# Patient Record
Sex: FEMALE | Race: BLACK OR AFRICAN AMERICAN | Employment: FULL TIME | URBAN - METROPOLITAN AREA
[De-identification: names, ages, dates, MRNs, and addresses within clinical notes are randomized per-mention and may not be internally consistent; named-entity substitution may affect disease eponyms.]

---

## 2019-11-14 ENCOUNTER — HOSPITAL ENCOUNTER (OUTPATIENT)
Dept: GENERAL RADIOLOGY | Age: 34
Discharge: HOME | End: 2019-11-14
Payer: COMMERCIAL

## 2019-11-14 DIAGNOSIS — Z13.820 ENCOUNTER FOR SCREENING FOR OSTEOPOROSIS: ICD-10-CM

## 2019-11-14 PROCEDURE — 77080 DXA BONE DENSITY AXIAL: CPT

## 2020-02-04 ENCOUNTER — HOSPITAL ENCOUNTER (OUTPATIENT)
Dept: GENERAL RADIOLOGY | Age: 35
Discharge: HOME | End: 2020-02-04
Payer: COMMERCIAL

## 2020-02-04 PROCEDURE — 77080 DXA BONE DENSITY AXIAL: CPT

## 2024-01-29 NOTE — PROGRESS NOTES
[facility]    CC: Symptomatic macromastia    REFERRING PHYSICIAN: Self    HPI: This is a 38 y.o.  female who presents to clinic with desire for breast reduction.  Her pertinent breast history include the following:    Last Mammogram: n/a    Current bra size: 42 F  Desired bra size: \"small as possible\"  Pregnancies/miscarriages: 6/3  Breast feeding: no future plans    Breast Symptoms:    Macromastia Symptoms:  Upper back pain: No, lower back pain      Bra strap grooves: Yes      Wears supportive bras (>1 yr): Yes      Tried conservative measures (PT, MDs,etc): No      Intertrigo: Yes      Head/neck pain: Yes      Headaches: Yes      Paresthesias of hands/fingers: Yes      PMHx: No past medical history on file.  PSHx: No past surgical history on file.  ALLERGIES: Not on File  SOCIAL: Tobacco/alcohol/caffeine: tobacco use: ACTIVELY SMOKING NICOTINE  FHx: Past history of breast CA: Maternal grandmother, Maternal aunt   Past family members with breast reduction: Yes mother   Past family members with breast augmentation:No     Meds:   No current outpatient medications on file.     No current facility-administered medications for this visit.       ROS   Constitutional: Negative for chills and fever.   HENT: Negative for congestion, facial swelling, and voice change.    Eyes: Negative for photophobia and visual disturbance.   Respiratory: Negative for apnea, cough, chest tightness and shortness of breath.    Cardiovascular: Negative for chest pain and palpitations.   Gastrointestinal: Negative for dysphagia and early satiety.  Genitourinary: Negative for difficulty urinating, dysuria, flank pain, frequency and hematuria.   Musculoskeletal: See HPI.  Skin: Negative for color change, pallor and rash.   Endocrine: negative for tremors, temperature intolerance or polydipsia.  Allergic/Immunologic: Negative for new environmental or food allergies.  Neurological: See HPI.  Hematological: Negative

## 2024-01-30 ENCOUNTER — OFFICE VISIT (OUTPATIENT)
Dept: SURGERY | Age: 39
End: 2024-01-30
Payer: COMMERCIAL

## 2024-01-30 VITALS
HEART RATE: 79 BPM | DIASTOLIC BLOOD PRESSURE: 91 MMHG | TEMPERATURE: 98 F | RESPIRATION RATE: 16 BRPM | WEIGHT: 277 LBS | OXYGEN SATURATION: 99 % | HEIGHT: 62 IN | SYSTOLIC BLOOD PRESSURE: 138 MMHG | BODY MASS INDEX: 50.97 KG/M2

## 2024-01-30 DIAGNOSIS — E66.01 CLASS 3 SEVERE OBESITY WITH BODY MASS INDEX (BMI) OF 50.0 TO 59.9 IN ADULT, UNSPECIFIED OBESITY TYPE, UNSPECIFIED WHETHER SERIOUS COMORBIDITY PRESENT (HCC): ICD-10-CM

## 2024-01-30 DIAGNOSIS — F17.200 SMOKING: Primary | ICD-10-CM

## 2024-01-30 PROCEDURE — 99204 OFFICE O/P NEW MOD 45 MIN: CPT

## 2024-05-20 ENCOUNTER — OFFICE VISIT (OUTPATIENT)
Dept: SURGERY | Age: 39
End: 2024-05-20
Payer: COMMERCIAL

## 2024-05-20 VITALS
HEART RATE: 80 BPM | TEMPERATURE: 98.7 F | BODY MASS INDEX: 50.8 KG/M2 | WEIGHT: 280 LBS | OXYGEN SATURATION: 98 % | SYSTOLIC BLOOD PRESSURE: 120 MMHG | DIASTOLIC BLOOD PRESSURE: 89 MMHG

## 2024-05-20 DIAGNOSIS — N62 MACROMASTIA: Primary | ICD-10-CM

## 2024-05-20 PROCEDURE — G8428 CUR MEDS NOT DOCUMENT: HCPCS

## 2024-05-20 PROCEDURE — 4004F PT TOBACCO SCREEN RCVD TLK: CPT

## 2024-05-20 PROCEDURE — G8417 CALC BMI ABV UP PARAM F/U: HCPCS

## 2024-05-20 PROCEDURE — 99213 OFFICE O/P EST LOW 20 MIN: CPT

## 2024-05-20 NOTE — PROGRESS NOTES
[facility]    CC: Symptomatic macromastia    REFERRING PHYSICIAN: Self    HPI: This is a 38 y.o.  female who presents to clinic with desire for breast reduction.  Her pertinent breast history include the following:    Since our last evaluation the patient has actually gained 3 pounds. She states she has been under a lot of stress and has been working to quit smoking. She is down to half a pack per day. She did not see Dr. Mello either.     Last Mammogram: n/a    Current bra size: 42 F  Desired bra size: \"small as possible\"  Pregnancies/miscarriages: 6/3  Breast feeding: no future plans    Breast Symptoms:    Macromastia Symptoms:  Upper back pain: No, lower back pain      Bra strap grooves: Yes      Wears supportive bras (>1 yr): Yes      Tried conservative measures (PT, MDs,etc): No      Intertrigo: Yes      Head/neck pain: Yes      Headaches: Yes      Paresthesias of hands/fingers: Yes      PMHx: No past medical history on file.  PSHx: No past surgical history on file.  ALLERGIES: No Known Allergies  SOCIAL: Tobacco/alcohol/caffeine: tobacco use: ACTIVELY SMOKING NICOTINE  FHx: Past history of breast CA: Maternal grandmother, Maternal aunt   Past family members with breast reduction: Yes mother   Past family members with breast augmentation:No     Meds:   No current outpatient medications on file.     No current facility-administered medications for this visit.       ROS   Constitutional: Negative for chills and fever.   HENT: Negative for congestion, facial swelling, and voice change.    Eyes: Negative for photophobia and visual disturbance.   Respiratory: Negative for apnea, cough, chest tightness and shortness of breath.    Cardiovascular: Negative for chest pain and palpitations.   Gastrointestinal: Negative for dysphagia and early satiety.  Genitourinary: Negative for difficulty urinating, dysuria, flank pain, frequency and hematuria.   Musculoskeletal: See HPI.  Skin:

## 2025-01-09 ENCOUNTER — OFFICE VISIT (OUTPATIENT)
Age: 40
End: 2025-01-09

## 2025-01-09 VITALS
BODY MASS INDEX: 51.89 KG/M2 | WEIGHT: 282 LBS | HEART RATE: 89 BPM | HEIGHT: 62 IN | RESPIRATION RATE: 18 BRPM | DIASTOLIC BLOOD PRESSURE: 106 MMHG | OXYGEN SATURATION: 99 % | TEMPERATURE: 98.5 F | SYSTOLIC BLOOD PRESSURE: 149 MMHG

## 2025-01-09 DIAGNOSIS — S29.012A UPPER BACK STRAIN, INITIAL ENCOUNTER: Primary | ICD-10-CM

## 2025-01-09 RX ORDER — AMLODIPINE BESYLATE 5 MG/1
5 TABLET ORAL DAILY
COMMUNITY

## 2025-01-09 RX ORDER — IBUPROFEN 800 MG/1
800 TABLET, FILM COATED ORAL 3 TIMES DAILY PRN
Qty: 21 TABLET | Refills: 0 | Status: SHIPPED | OUTPATIENT
Start: 2025-01-09 | End: 2025-01-16

## 2025-01-09 RX ORDER — DEXTROAMPHETAMINE SACCHARATE, AMPHETAMINE ASPARTATE, DEXTROAMPHETAMINE SULFATE AND AMPHETAMINE SULFATE 2.5; 2.5; 2.5; 2.5 MG/1; MG/1; MG/1; MG/1
TABLET ORAL
COMMUNITY
Start: 2024-10-30

## 2025-01-09 RX ORDER — METOPROLOL SUCCINATE 25 MG/1
25 TABLET, EXTENDED RELEASE ORAL DAILY
COMMUNITY

## 2025-01-09 RX ORDER — CYCLOBENZAPRINE HCL 5 MG
5 TABLET ORAL NIGHTLY PRN
Qty: 10 TABLET | Refills: 0 | Status: SHIPPED | OUTPATIENT
Start: 2025-01-09 | End: 2025-01-19

## 2025-01-09 ASSESSMENT — ENCOUNTER SYMPTOMS: BACK PAIN: 1

## 2025-01-09 NOTE — PROGRESS NOTES
Cheyanne Martinez (: 1985) is a 39 y.o. female, New patient, here for evaluation of the following chief complaint(s):  Back Pain (Pt c/o upper back pain across the shoulder blades possibly from her 2 year sleeping with her at night)      ASSESSMENT/PLAN:    ICD-10-CM    1. Upper back strain, initial encounter  S29.012A ibuprofen (ADVIL;MOTRIN) 800 MG tablet     cyclobenzaprine (FLEXERIL) 5 MG tablet            Discussed PCP follow up for persisting or worsening symptoms, or to return to the clinic if unable to obtain PCP follow up for worsening symptoms.    The patient tolerated their visit well. The patient and/or the family were informed of the results of any tests, a time was given to answer questions, a plan was proposed and they agreed with plan. Reviewed AVS with treatment instructions and answered questions - pt/family expresses understanding and agreement with the discussed treatment plan and AVS instructions.      SUBJECTIVE/OBJECTIVE:  HPI     Back Pain     Additional comments: Pt c/o upper back pain across the shoulder blades   possibly from her 2 year sleeping with her at night          Last edited by Rosamaria Castro MA on 2025  9:30 AM.            Back Pain        VITAL SIGNS  Vitals:    25 0927   BP: (!) 149/106   Pulse: 89   Resp: 18   Temp: 98.5 °F (36.9 °C)   SpO2: 99%   Weight: 127.9 kg (282 lb)   Height: 1.575 m (5' 2\")       Review of Systems   Musculoskeletal:  Positive for back pain.     See HPI for pertinent positives and negatives.    Physical Exam  Constitutional:       General: She is not in acute distress.     Appearance: Normal appearance.   HENT:      Head: Normocephalic and atraumatic.      Right Ear: Tympanic membrane and ear canal normal.      Left Ear: Tympanic membrane and ear canal normal.      Nose: Nose normal.      Mouth/Throat:      Mouth: Mucous membranes are moist.   Eyes:      Pupils: Pupils are equal, round, and reactive to light.   Cardiovascular:

## 2025-01-09 NOTE — PATIENT INSTRUCTIONS
Cheyanne,    Thank you for trusting [facility] with your health care needs. Your decision to come to us means a lot, and we are honored to be part of your healthcare journey.  At [facility], our dedicated team is committed to providing you with the highest quality of care in a warm and welcoming environment. Your health and well-being are our top priorities, and we appreciate the opportunity to serve you.    Thank you for choosing us, and we’re here for you whenever you need us!    Warm regards,       [facility]    [] Dr. Murillo [] KAMALJIT Black, Supervisor       [] CHERRI Keller    [] RT Julia    [] KAMALJIT Rothman    [] KAMALJIT Najera  [] KAMALJIT Perez   [] KAMALJIT Powell    [] KAMALJIT Bhardwaj

## 2025-01-14 ENCOUNTER — OFFICE VISIT (OUTPATIENT)
Age: 40
End: 2025-01-14

## 2025-01-14 VITALS
HEART RATE: 86 BPM | SYSTOLIC BLOOD PRESSURE: 142 MMHG | DIASTOLIC BLOOD PRESSURE: 105 MMHG | TEMPERATURE: 98.4 F | OXYGEN SATURATION: 98 % | RESPIRATION RATE: 18 BRPM

## 2025-01-14 DIAGNOSIS — S29.012A UPPER BACK STRAIN, INITIAL ENCOUNTER: Primary | ICD-10-CM

## 2025-01-14 PROBLEM — H40.009 GLAUCOMA SUSPECT: Status: ACTIVE | Noted: 2025-01-14

## 2025-01-14 PROBLEM — H54.7 VISUAL IMPAIRMENT: Status: ACTIVE | Noted: 2025-01-14

## 2025-01-14 PROBLEM — H54.7: Status: ACTIVE | Noted: 2025-01-14

## 2025-01-14 RX ORDER — PREDNISONE 10 MG/1
TABLET ORAL
Qty: 21 TABLET | Refills: 0 | Status: SHIPPED | OUTPATIENT
Start: 2025-01-14

## 2025-01-14 RX ORDER — METHOCARBAMOL 500 MG/1
750 TABLET, FILM COATED ORAL 4 TIMES DAILY
Qty: 60 TABLET | Refills: 0 | Status: SHIPPED | OUTPATIENT
Start: 2025-01-14 | End: 2025-01-24

## 2025-01-14 NOTE — PROGRESS NOTES
Exam  Vitals reviewed.   Constitutional:       Appearance: NAD.   HENT:      Head: Normocephalic.      Mouth/Throat:      Mouth: Mucous membranes are moist.   Eyes:      Pupils: Pupils are equal, round, and reactive to light.   Pulmonary:      Effort: Pulmonary effort is normal.   Abdominal:      Tenderness: There is no guarding.   Musculoskeletal:      bilateral trapezius tenderness, LROM with flexion and extension of neck.   Skin:     General: No jaundice.   Neurological:      Mental Status: Alert and oriented to person, place, and time.     An electronic signature was used to authenticate this note.    José Miguel Ott, APRN - CNP